# Patient Record
Sex: FEMALE | Race: WHITE | NOT HISPANIC OR LATINO | Employment: FULL TIME | URBAN - METROPOLITAN AREA
[De-identification: names, ages, dates, MRNs, and addresses within clinical notes are randomized per-mention and may not be internally consistent; named-entity substitution may affect disease eponyms.]

---

## 2020-02-03 ENCOUNTER — TRANSCRIBE ORDERS (OUTPATIENT)
Dept: PHYSICAL THERAPY | Facility: CLINIC | Age: 30
End: 2020-02-03

## 2020-02-03 DIAGNOSIS — G89.29 CHRONIC LOW BACK PAIN, UNSPECIFIED BACK PAIN LATERALITY, UNSPECIFIED WHETHER SCIATICA PRESENT: Primary | ICD-10-CM

## 2020-02-03 DIAGNOSIS — M54.50 CHRONIC LOW BACK PAIN, UNSPECIFIED BACK PAIN LATERALITY, UNSPECIFIED WHETHER SCIATICA PRESENT: Primary | ICD-10-CM

## 2020-02-12 ENCOUNTER — TREATMENT (OUTPATIENT)
Dept: PHYSICAL THERAPY | Facility: CLINIC | Age: 30
End: 2020-02-12

## 2020-02-12 DIAGNOSIS — M54.50 CHRONIC BILATERAL LOW BACK PAIN WITHOUT SCIATICA: Primary | ICD-10-CM

## 2020-02-12 DIAGNOSIS — G89.29 CHRONIC BILATERAL LOW BACK PAIN WITHOUT SCIATICA: Primary | ICD-10-CM

## 2020-02-12 PROCEDURE — 97161 PT EVAL LOW COMPLEX 20 MIN: CPT | Performed by: PHYSICAL THERAPIST

## 2020-02-12 NOTE — PATIENT INSTRUCTIONS
Pt was educated on findings of evaluation, purpose of treatment and goals for therapy. Treatment options discussed and questions answered.

## 2020-02-12 NOTE — PROGRESS NOTES
Physical Therapy Initial Evaluation and Plan of Care    Patient: Fatou Del Toro   : 1990  Diagnosis/ICD-10 Code:  Chronic bilateral low back pain without sciatica [M54.5, G89.29]  Referring practitioner: Sharri Taylor MD    Subjective Evaluation    History of Present Illness  Mechanism of injury: Pt reports she had surgery for kidney stones the end of 2019 and has been having back pain since. Reports progressing worsening  pain within the past 6 months with no relief. Pt report she is a  and is on her feet a lot have has to do heavy lifting and prolonged standing. Pt followed up with urologist in 2020 and her kidney report is normal.   PLOF: independent  PMH reviewed: ( 2 right knee surgeries, kidney stones, kidney surgery)      Patient Occupation:   Quality of life: good    Pain  Current pain ratin  At best pain ratin  At worst pain ratin  Location: low back   Quality: dull ache, discomfort, sharp, radiating, throbbing and tight  Relieving factors: change in position and support  Aggravating factors: lifting, prolonged positioning, ambulation and standing  Progression: no change    Hand dominance: right    Treatments  Current treatment: physical therapy  Patient Goals  Patient goals for therapy: decreased pain, increased motion, increased strength and independence with ADLs/IADLs             Objective       Static Posture     Head  Forward.    Shoulders  Rounded.    Thoracic Spine  Hyperkyphosis.    Postural Observations  Seated posture: fair  Standing posture: fair        Palpation   Left   Tenderness of the erector spinae and lumbar paraspinals.     Right Tenderness of the erector spinae and lumbar paraspinals.     Tenderness     Left Hip   No tenderness in the PSIS.     Right Hip   No tenderness in the PSIS.     Active Range of Motion     Lumbar   Flexion: WFL and with pain  Left lateral flexion: Active left lumbar lateral flexion: 50. WFL  Right lateral flexion: Active  right lumbar lateral flexion: 50. with pain  Left rotation: Active left lumbar rotation: 50. WFL  Right rotation: Active right lumbar rotation: 50. with pain    Strength/Myotome Testing     Left Hip   Planes of Motion   Flexion: 4  Abduction: 5  Adduction: 5    Right Hip   Planes of Motion   Flexion: 5  Abduction: 5  Adduction: 5    Left Knee   Flexion: 5  Extension: 5    Right Knee   Flexion: 5  Extension: 5    Left Ankle/Foot   Dorsiflexion: 5    Right Ankle/Foot   Dorsiflexion: 5    Functional Assessment     Comments  Owestry back: 22         Assessment & Plan     Assessment  Impairments: abnormal or restricted ROM, activity intolerance, impaired physical strength, lacks appropriate home exercise program and pain with function  Assessment details: Pt presents to PT with symptoms consistent with low back pain. Pt presents with decreased posture and thoracic hyperkyphosis. Pt would benefit from skilled PT intervention to address the deficits noted.     Prognosis: good  Functional Limitations: carrying objects, lifting, sleeping, walking, uncomfortable because of pain, moving in bed, sitting, standing and unable to perform repetitive tasks  Goals  Plan Goals: SHORT TERM GOALS: Time for Goal Achievement: 4 weeks    1.  Patient to be compliant and progression of HEP                             2.  Pain level < 5/10 at worst with daily  activities to improve function  3.  Increased thoracic, lumbar and SIJ mobility to allow for increased lumbar AROM with less pain.  4.  Increased lumbar AROM to by 25% in all planes to allow for increased ease with sit-stand transfers and functional activities    LONG TERM GOALS: Time for Goal Achievement: 8 weeks   1.  Pt. to score < 20% % on Back Index  2.  Pain level < 3/10 with all daily activities to return to normal.  3.  Lumbar AROM to WFL to allow for return to household & recreational activities w/o increased symptoms  4.  (B) LE and lower abdominal strength to 5/5 to allow for  pushing, pulling and activities to occur without pain (driving, sitting, household  & Job requirements)        Plan  Therapy options: will be seen for skilled physical therapy services  Planned modality interventions: cryotherapy, electrical stimulation/Russian stimulation, TENS, thermotherapy (hydrocollator packs) and ultrasound  Planned therapy interventions: body mechanics training, flexibility, functional ROM exercises, home exercise program, manual therapy, neuromuscular re-education, postural training, spinal/joint mobilization, stretching, strengthening, soft tissue mobilization, abdominal trunk stabilization and therapeutic activities  Frequency: 2x week  Duration in visits: 16  Treatment plan discussed with: patient        Manual Therapy:          mins  10912;  Therapeutic Exercise:          mins  77857;     Neuromuscular Sabrina:         mins  54309;    Therapeutic Activity:           mins  24256;     Gait Training:            mins  05172;     Ultrasound:           mins  39740;    Electrical Stimulation:          mins  64229 ( );  Dry Needling           mins self-pay  Traction           mins 04299  Canalith Repositioning         mins 72216      Timed Treatment:      mins   Total Treatment:     30   mins    PT SIGNATURE: Rosie Christensen PT   [place] Lic #[id number]    DATE TREATMENT INITIATED: 2/12/2020    Initial Certification  Certification Period: 5/12/2020  I certify that the therapy services are furnished while this patient is under my care.  The services outlined above are required by this patient, and will be reviewed every 90 days.     PHYSICIAN: Sharri Taylor MD      DATE:     Please sign and return via fax to [phone].. Thank you, [facility].

## 2020-02-18 ENCOUNTER — TREATMENT (OUTPATIENT)
Dept: PHYSICAL THERAPY | Facility: CLINIC | Age: 30
End: 2020-02-18

## 2020-02-18 DIAGNOSIS — M54.50 CHRONIC BILATERAL LOW BACK PAIN WITHOUT SCIATICA: Primary | ICD-10-CM

## 2020-02-18 DIAGNOSIS — G89.29 CHRONIC BILATERAL LOW BACK PAIN WITHOUT SCIATICA: Primary | ICD-10-CM

## 2020-02-18 PROCEDURE — 97530 THERAPEUTIC ACTIVITIES: CPT | Performed by: PHYSICAL THERAPIST

## 2020-02-18 PROCEDURE — 97014 ELECTRIC STIMULATION THERAPY: CPT | Performed by: PHYSICAL THERAPIST

## 2020-02-18 PROCEDURE — 97110 THERAPEUTIC EXERCISES: CPT | Performed by: PHYSICAL THERAPIST

## 2020-02-18 NOTE — PROGRESS NOTES
Physical Therapy Daily Progress Note  Visit: 2    Subjective Fatou Del Toro reports: soreness in thoracic spine area.     Objective   See Exercise, Manual, and Modality Logs for complete treatment. Provided instruction in exercises and proper technique and purpose of exercises. Educated pt on posture and postural awareness. Educated pt on anatomy and structure of affected musculature. Issued HEP with blue theraband.       Assessment/Plan: decreased posture and scapulothoracic strength. Plan details: Progress ROM / strengthening / stabilization / functional activity as tolerated     Manual Therapy:          mins  09877;  Therapeutic Exercise:     10     mins  15575;     Neuromuscular Sabrina:         mins  42866;    Therapeutic Activity:      15     mins  36324;     Gait Training:            mins  05214;     Ultrasound:           mins  80292;    Electrical Stimulation:     15     mins  67350 ( );  Dry Needling           mins self-pay  Traction           mins 23157  Canalith Repositioning         mins 11083      Timed Treatment:  25    mins   Total Treatment:   40     mins    Rosie Christensen PT  [place] License #: [id number]    Physical Therapist

## 2020-02-18 NOTE — PATIENT INSTRUCTIONS
Access Code: [email or web address]   URL: [email or web address]   Date: 02/18/2020   Prepared by: Rosie Christensen     Exercises   Sidelying Thoracic Rotation with Open Book - 10 reps - 3 sets - 1x daily - 7x weekly   Scapular Retraction with Resistance - 10 reps - 3 sets - 1x daily - 7x weekly   Pec Minor Stretch - 5 reps - 1 sets - 20 hold - 1x daily - 7x weekly

## 2020-02-19 ENCOUNTER — OFFICE VISIT (OUTPATIENT)
Dept: GASTROENTEROLOGY | Facility: CLINIC | Age: 30
End: 2020-02-19

## 2020-02-19 VITALS — HEIGHT: 67 IN | BODY MASS INDEX: 24.17 KG/M2 | WEIGHT: 154 LBS

## 2020-02-19 DIAGNOSIS — R10.9 CHRONIC ABDOMINAL PAIN: ICD-10-CM

## 2020-02-19 DIAGNOSIS — R10.13 EPIGASTRIC PAIN: Primary | ICD-10-CM

## 2020-02-19 DIAGNOSIS — G89.29 CHRONIC ABDOMINAL PAIN: ICD-10-CM

## 2020-02-19 PROCEDURE — 99204 OFFICE O/P NEW MOD 45 MIN: CPT | Performed by: INTERNAL MEDICINE

## 2020-02-19 RX ORDER — OMEPRAZOLE 20 MG/1
20 CAPSULE, DELAYED RELEASE ORAL DAILY
Qty: 90 CAPSULE | Refills: 3 | Status: SHIPPED | OUTPATIENT
Start: 2020-02-19

## 2020-02-19 RX ORDER — ESCITALOPRAM OXALATE 10 MG/1
TABLET ORAL
COMMUNITY
Start: 2020-01-29

## 2020-02-19 RX ORDER — HYOSCYAMINE SULFATE EXTENDED-RELEASE 0.38 MG/1
0.38 TABLET ORAL EVERY 12 HOURS PRN
Qty: 60 TABLET | Refills: 12 | Status: SHIPPED | OUTPATIENT
Start: 2020-02-19

## 2020-02-19 NOTE — PROGRESS NOTES
PATIENT INFORMATION  Fatou Del Toro       - 1990    CHIEF COMPLAINT  Chief Complaint   Patient presents with   • Abdominal Pain   • Constipation   • Heartburn       HISTORY OF PRESENT ILLNESS  Here for Abd pain present for years.  BLQ pressure and sharp Am is worse and better after a BM and food will aggravate but an Hour delay. Rare Nocturnal awakening and a BM and smoking POT appears to help.     1-3 Bms a day and no skipping.  Does avoid NSAIDS due to chest tightness from Aleve  but not with Ibuprofen but uses tylenol     Has history of Kidney stones and ESWL and Cysto.    Is lactosesensitive and onother knkown foodallergies          REVIEW OF SYSTEMS  Review of Systems   Constitutional: Positive for appetite change, fatigue and unexpected weight change.   HENT: Positive for congestion, ear pain and rhinorrhea.    Eyes: Positive for itching.   Cardiovascular: Positive for chest pain.   Gastrointestinal: Positive for abdominal distention, abdominal pain, constipation, nausea and vomiting.        Reflux   All other systems reviewed and are negative.        ACTIVE PROBLEMS  There are no active problems to display for this patient.        PAST MEDICAL HISTORY  History reviewed. No pertinent past medical history.      SURGICAL HISTORY  History reviewed. No pertinent surgical history.      FAMILY HISTORY  Family History   Problem Relation Age of Onset   • Colon cancer Neg Hx    • Colon polyps Neg Hx          SOCIAL HISTORY  Social History     Occupational History   • Not on file   Tobacco Use   • Smoking status: Current Every Day Smoker     Types: Electronic Cigarette   • Smokeless tobacco: Never Used   Substance and Sexual Activity   • Alcohol use: Yes   • Drug use: Yes   • Sexual activity: Not on file       Debilities/Disabilities Identified: None    Emotional Behavior: Appropriate    CURRENT MEDICATIONS    Current Outpatient Medications:   •  escitalopram (LEXAPRO) 10 MG tablet, , Disp: , Rfl:   •   "hyoscyamine (LEVBID) 0.375 MG 12 hr tablet, Take 1 tablet by mouth Every 12 (Twelve) Hours As Needed for Cramping., Disp: 60 tablet, Rfl: 12  •  omeprazole (priLOSEC) 20 MG capsule, Take 1 capsule by mouth Daily., Disp: 90 capsule, Rfl: 3    ALLERGIES  Naproxen    VITALS  Vitals:    02/19/20 1038   Weight: 69.9 kg (154 lb)   Height: 170.2 cm (67\")       LAST RESULTS   Conversion Encounter on 02/22/2014   Component Date Value Ref Range Status   • HCG Urine, QL 02/23/2014 Negative   Final     No results found.    PHYSICAL EXAM  Physical Exam   Constitutional: She is oriented to person, place, and time. She appears well-developed and well-nourished.   HENT:   Head: Normocephalic and atraumatic.   Eyes: Pupils are equal, round, and reactive to light. Conjunctivae and EOM are normal. No scleral icterus.   Neck: Normal range of motion. Neck supple. No thyromegaly present.   Cardiovascular: Normal rate, regular rhythm, normal heart sounds and intact distal pulses. Exam reveals no gallop.   No murmur heard.  Pulmonary/Chest: Effort normal and breath sounds normal. She has no wheezes. She has no rales.   Abdominal: Soft. Bowel sounds are normal. She exhibits no shifting dullness, no distension, no fluid wave, no abdominal bruit, no ascites and no mass. There is no hepatosplenomegaly. There is no tenderness. There is no guarding and negative Dobson's sign. Hernia confirmed negative in the ventral area.   Musculoskeletal: Normal range of motion. She exhibits no edema.   Lymphadenopathy:     She has no cervical adenopathy.   Neurological: She is alert and oriented to person, place, and time.   Skin: Skin is warm and dry. No rash noted. She is not diaphoretic. No erythema.   Psychiatric: She has a normal mood and affect. Her behavior is normal.       ASSESSMENT  Diagnoses and all orders for this visit:    Epigastric pain  -     Helicobacter Pylori, IgA IgG IgM; Future    Chronic abdominal pain    Other orders  -     escitalopram " (LEXAPRO) 10 MG tablet  -     hyoscyamine (LEVBID) 0.375 MG 12 hr tablet; Take 1 tablet by mouth Every 12 (Twelve) Hours As Needed for Cramping.  -     omeprazole (priLOSEC) 20 MG capsule; Take 1 capsule by mouth Daily.          PLAN  Return in about 2 months (around 4/19/2020).

## 2020-02-21 ENCOUNTER — LAB (OUTPATIENT)
Dept: LAB | Facility: HOSPITAL | Age: 30
End: 2020-02-21

## 2020-02-21 DIAGNOSIS — R10.13 EPIGASTRIC PAIN: ICD-10-CM

## 2020-02-21 PROCEDURE — 86677 HELICOBACTER PYLORI ANTIBODY: CPT | Performed by: INTERNAL MEDICINE

## 2020-02-21 PROCEDURE — 36415 COLL VENOUS BLD VENIPUNCTURE: CPT

## 2020-02-24 LAB
H PYLORI IGA SER IA-ACNC: <9 UNITS (ref 0–8.9)
H PYLORI IGG SER IA-ACNC: 0.21 INDEX VALUE (ref 0–0.79)
H PYLORI IGM SER-ACNC: <9 UNITS (ref 0–8.9)

## 2020-02-25 ENCOUNTER — TREATMENT (OUTPATIENT)
Dept: PHYSICAL THERAPY | Facility: CLINIC | Age: 30
End: 2020-02-25

## 2020-02-25 DIAGNOSIS — M54.50 CHRONIC BILATERAL LOW BACK PAIN WITHOUT SCIATICA: Primary | ICD-10-CM

## 2020-02-25 DIAGNOSIS — G89.29 CHRONIC BILATERAL LOW BACK PAIN WITHOUT SCIATICA: Primary | ICD-10-CM

## 2020-02-25 PROCEDURE — 97110 THERAPEUTIC EXERCISES: CPT | Performed by: PHYSICAL THERAPIST

## 2020-02-25 PROCEDURE — 97014 ELECTRIC STIMULATION THERAPY: CPT | Performed by: PHYSICAL THERAPIST

## 2020-02-25 NOTE — PROGRESS NOTES
Physical Therapy Daily Progress Note  Visit: 3    Subjective Fatou Del Toro reports:  She is being more aware of her posture and postural correction. reprots not feeling to good today due to GI issues. Objective   See Exercise, Manual, and Modality Logs for complete treatment. Provided instruction in exercises and proper technique and purpose of exercises.       Assessment/Plan: Compliant/cooperative with current rehab efforts.  Benefits from verbal/tactile cues to ensure correct exercise performance/technique, hold time and position. Plan details: Progress ROM / strengthening / stabilization / functional activity as tolerated     Manual Therapy:          mins  58129;  Therapeutic Exercise:     25     mins  71421;     Neuromuscular Sabrina:         mins  73979;    Therapeutic Activity:           mins  67944;     Gait Training:            mins  34562;     Ultrasound:           mins  00398;    Electrical Stimulation:     15     mins  58625 ( );  Dry Needling           mins self-pay  Traction           mins 53705  Canalith Repositioning         mins 63813      Timed Treatment: 25     mins   Total Treatment:    40    mins    Rosie Christensen PT  [place] License #: [id number]    Physical Therapist

## 2020-02-27 ENCOUNTER — TREATMENT (OUTPATIENT)
Dept: PHYSICAL THERAPY | Facility: CLINIC | Age: 30
End: 2020-02-27

## 2020-02-27 DIAGNOSIS — M54.50 CHRONIC BILATERAL LOW BACK PAIN WITHOUT SCIATICA: Primary | ICD-10-CM

## 2020-02-27 DIAGNOSIS — G89.29 CHRONIC BILATERAL LOW BACK PAIN WITHOUT SCIATICA: Primary | ICD-10-CM

## 2020-02-27 PROCEDURE — 97110 THERAPEUTIC EXERCISES: CPT | Performed by: PHYSICAL THERAPIST

## 2020-02-27 PROCEDURE — 97014 ELECTRIC STIMULATION THERAPY: CPT | Performed by: PHYSICAL THERAPIST

## 2020-02-27 NOTE — PROGRESS NOTES
Physical Therapy Daily Progress Note  Visit: 4    Subjective Fatou Del Toro reports: her back is about the same     Objective   See Exercise, Manual, and Modality Logs for complete treatment. Provided instruction in exercises and proper technique and purpose of exercises. Added shoulder horizontal abd with red theraband exercise.       Assessment/Plan:  Fatigued easily with shoulder horizontal abduction. Weakness in scapulothoracic muscles. Improved posture self awareness. Compliant/cooperative with current rehab efforts.  Benefits from verbal/tactile cues to ensure correct exercise performance/technique, hold time and position. Plan details: Progress ROM / strengthening / stabilization / functional activity as tolerated     Manual Therapy:          mins  58424;  Therapeutic Exercise:     25     mins  43925;     Neuromuscular Sabrina:         mins  25633;    Therapeutic Activity:           mins  68047;     Gait Training:            mins  40501;     Ultrasound:           mins  26789;    Electrical Stimulation:    15      mins  79315 ( );  Dry Needling           mins self-pay  Traction           mins 66809  Canalith Repositioning         mins 52384      Timed Treatment:  25    mins   Total Treatment:    40    mins    HIEN Owens License #: [id number]    Physical Therapist

## 2020-03-05 ENCOUNTER — TREATMENT (OUTPATIENT)
Dept: PHYSICAL THERAPY | Facility: CLINIC | Age: 30
End: 2020-03-05

## 2020-03-05 DIAGNOSIS — G89.29 CHRONIC BILATERAL LOW BACK PAIN WITHOUT SCIATICA: Primary | ICD-10-CM

## 2020-03-05 DIAGNOSIS — M54.50 CHRONIC BILATERAL LOW BACK PAIN WITHOUT SCIATICA: Primary | ICD-10-CM

## 2020-03-05 PROCEDURE — 97014 ELECTRIC STIMULATION THERAPY: CPT | Performed by: PHYSICAL THERAPIST

## 2020-03-05 PROCEDURE — 97110 THERAPEUTIC EXERCISES: CPT | Performed by: PHYSICAL THERAPIST

## 2020-03-05 NOTE — PROGRESS NOTES
Physical Therapy Daily Progress Note  Visit: 5    Subjective Fatou Del Toro reports: she doesn't feel well today due to her kidney stones. Reports compliance with HEP and the doorway stretch feels good     Objective   See Exercise, Manual, and Modality Logs for complete treatment. Provided instruction in exercises and proper technique and purpose of exercises.       Assessment/Plan: Compliant/cooperative with current rehab efforts.  Benefits from verbal/tactile cues to ensure correct exercise performance/technique, hold time and position. Plan details: Progress ROM / strengthening / stabilization / functional activity as tolerated     Manual Therapy:          mins  68355;  Therapeutic Exercise:    30      mins  43791;     Neuromuscular Sabrina:         mins  68140;    Therapeutic Activity:           mins  35688;     Gait Training:            mins  99315;     Ultrasound:           mins  54931;    Electrical Stimulation:     15     mins  83174 ( );  Dry Needling           mins self-pay  Traction           mins 21664  Canalith Repositioning         mins 20368      Timed Treatment:  30    mins   Total Treatment:   45     mins    Rosie Christensen PT  [place] License #: [id number]    Physical Therapist

## 2020-03-10 ENCOUNTER — TREATMENT (OUTPATIENT)
Dept: PHYSICAL THERAPY | Facility: CLINIC | Age: 30
End: 2020-03-10

## 2020-03-10 DIAGNOSIS — G89.29 CHRONIC BILATERAL LOW BACK PAIN WITHOUT SCIATICA: Primary | ICD-10-CM

## 2020-03-10 DIAGNOSIS — M54.50 CHRONIC BILATERAL LOW BACK PAIN WITHOUT SCIATICA: Primary | ICD-10-CM

## 2020-03-10 PROCEDURE — 97014 ELECTRIC STIMULATION THERAPY: CPT | Performed by: PHYSICAL THERAPIST

## 2020-03-10 PROCEDURE — 97110 THERAPEUTIC EXERCISES: CPT | Performed by: PHYSICAL THERAPIST

## 2020-03-10 NOTE — PROGRESS NOTES
Physical Therapy Daily Progress Note  Visit: 6    Subjective Fatou Del Toro reports: her back is doing a little better and doing better with postural awareness while working    Objective   See Exercise, Manual, and Modality Logs for complete treatment. Provided instruction in exercises and proper technique and purpose of exercises.       Assessment/Plan Compliant/cooperative with current rehab efforts.  Benefits from verbal/tactile cues to ensure correct exercise performance/technique, hold time and position. Plan details: Progress ROM / strengthening / stabilization / functional activity as tolerated     Manual Therapy:          mins  88524;  Therapeutic Exercise:   23       mins  00462;     Neuromuscular Sabrina:         mins  08519;    Therapeutic Activity:           mins  16543;     Gait Training:            mins  39442;     Ultrasound:           mins  07826;    Electrical Stimulation:   15       mins  29672 ( );  Dry Needling           mins self-pay  Traction           mins 51574  Canalith Repositioning         mins 59888      Timed Treatment:  23    mins   Total Treatment:    38    mins    HIEN Owens License #: [id number]    Physical Therapist

## 2020-04-28 ENCOUNTER — TELEMEDICINE (OUTPATIENT)
Dept: GASTROENTEROLOGY | Facility: CLINIC | Age: 30
End: 2020-04-28

## 2020-04-28 DIAGNOSIS — K58.0 IRRITABLE BOWEL SYNDROME WITH DIARRHEA: ICD-10-CM

## 2020-04-28 DIAGNOSIS — K21.9 GASTROESOPHAGEAL REFLUX DISEASE, ESOPHAGITIS PRESENCE NOT SPECIFIED: Primary | ICD-10-CM

## 2020-04-28 PROCEDURE — 99212 OFFICE O/P EST SF 10 MIN: CPT | Performed by: INTERNAL MEDICINE

## 2020-04-28 NOTE — PROGRESS NOTES
Video visit:  You have chosen to receive care through a telehealth visit.  Do you consent to use a video/audio connection for your medical care today? Yes    PATIENT INFORMATION  Fatou Del Toro       - 1990    CHIEF COMPLAINT  No chief complaint on file.      HISTORY OF PRESENT ILLNESS  Is a  so not working. Only time she has issues is on her Period and is eating  Better and has more energy    Minor constipation and then diarrhea. Is taking her LevBId and usually at night    Is only using her PPI PRN and will take in the AM for symptoms when she wakes up.          REVIEW OF SYSTEMS  Review of Systems      ACTIVE PROBLEMS  There are no active problems to display for this patient.        PAST MEDICAL HISTORY  No past medical history on file.      SURGICAL HISTORY  No past surgical history on file.      FAMILY HISTORY  Family History   Problem Relation Age of Onset   • Colon cancer Neg Hx    • Colon polyps Neg Hx          SOCIAL HISTORY  Social History     Occupational History   • Not on file   Tobacco Use   • Smoking status: Current Every Day Smoker     Types: Electronic Cigarette   • Smokeless tobacco: Never Used   Substance and Sexual Activity   • Alcohol use: Yes   • Drug use: Yes   • Sexual activity: Not on file         CURRENT MEDICATIONS    Current Outpatient Medications:   •  escitalopram (LEXAPRO) 10 MG tablet, , Disp: , Rfl:   •  hyoscyamine (LEVBID) 0.375 MG 12 hr tablet, Take 1 tablet by mouth Every 12 (Twelve) Hours As Needed for Cramping., Disp: 60 tablet, Rfl: 12  •  omeprazole (priLOSEC) 20 MG capsule, Take 1 capsule by mouth Daily., Disp: 90 capsule, Rfl: 3    ALLERGIES  Naproxen    VITALS  There were no vitals filed for this visit.    LAST RESULTS   Lab on 2020   Component Date Value Ref Range Status   • H. pylori IgG 2020 0.21  0.00 - 0.79 Index Value Final                                 Negative           <0.80                               Equivocal    0.80 - 0.89                                Positive           >0.89   • H. pylori, IgA ABS 02/21/2020 <9.0  0.0 - 8.9 units Final                                    Negative          <9.0                                  Equivocal   9.0 - 11.0                                  Positive         >11.0   • H. Pylori, IgM 02/21/2020 <9.0  0.0 - 8.9 units Final                                    Negative          <9.0                                  Equivocal   9.0 - 11.0                                  Positive         >11.0  This test was developed and its performance characteristics  determined by [facility]. It has not been cleared or approved  by the Food and Drug Administration.     No results found.    PHYSICAL EXAM  Debilities/Disabilities Identified: None  Emotional Behavior: Appropriate  Physical Exam    ASSESSMENT  Diagnoses and all orders for this visit:    Gastroesophageal reflux disease, esophagitis presence not specified    Irritable bowel syndrome with diarrhea          PLAN  Continue PRN PPI and Daily LevBid- call for refills and will see as needed  No follow-ups on file.    I have discussed the above plan with the patient.  They verbalize understanding and are in agreement with the plan.  They have been advised to contact the office for any questions, concerns, or changes related to their health.

## 2021-04-16 ENCOUNTER — BULK ORDERING (OUTPATIENT)
Dept: CASE MANAGEMENT | Facility: OTHER | Age: 31
End: 2021-04-16

## 2021-04-16 DIAGNOSIS — Z23 IMMUNIZATION DUE: ICD-10-CM
